# Patient Record
Sex: MALE | Race: WHITE | Employment: FULL TIME | URBAN - METROPOLITAN AREA
[De-identification: names, ages, dates, MRNs, and addresses within clinical notes are randomized per-mention and may not be internally consistent; named-entity substitution may affect disease eponyms.]

---

## 2022-12-26 ENCOUNTER — APPOINTMENT (OUTPATIENT)
Dept: CT IMAGING | Age: 42
DRG: 638 | End: 2022-12-26

## 2022-12-26 ENCOUNTER — APPOINTMENT (OUTPATIENT)
Dept: GENERAL RADIOLOGY | Age: 42
DRG: 638 | End: 2022-12-26

## 2022-12-26 ENCOUNTER — HOSPITAL ENCOUNTER (INPATIENT)
Age: 42
LOS: 1 days | Discharge: HOME | DRG: 638 | End: 2022-12-28
Attending: EMERGENCY MEDICINE | Admitting: STUDENT IN AN ORGANIZED HEALTH CARE EDUCATION/TRAINING PROGRAM

## 2022-12-26 DIAGNOSIS — E11.628 TYPE 2 DIABETES MELLITUS WITH OTHER SKIN COMPLICATION, WITHOUT LONG-TERM CURRENT USE OF INSULIN (HCC): ICD-10-CM

## 2022-12-26 DIAGNOSIS — E11.9 DIABETES MELLITUS, NEW ONSET (HCC): ICD-10-CM

## 2022-12-26 DIAGNOSIS — E11.622 TYPE 2 DIABETES MELLITUS WITH OTHER SKIN ULCER, WITHOUT LONG-TERM CURRENT USE OF INSULIN (HCC): ICD-10-CM

## 2022-12-26 DIAGNOSIS — R73.9 HYPERGLYCEMIA: ICD-10-CM

## 2022-12-26 DIAGNOSIS — L03.115 CELLULITIS OF RIGHT LOWER EXTREMITY: Primary | ICD-10-CM

## 2022-12-26 LAB
ABSOLUTE EOS #: 0 K/UL (ref 0–0.4)
ABSOLUTE LYMPH #: 0.8 K/UL (ref 1–4.8)
ABSOLUTE MONO #: 0.7 K/UL (ref 0.1–1.2)
ALLEN TEST: ABNORMAL
ANION GAP SERPL CALCULATED.3IONS-SCNC: 17 MMOL/L (ref 9–17)
BACTERIA: ABNORMAL
BASOPHILS # BLD: 0 % (ref 0–2)
BASOPHILS ABSOLUTE: 0 K/UL (ref 0–0.2)
BETA-HYDROXYBUTYRATE: 1.7 MMOL/L (ref 0.02–0.27)
BILIRUBIN URINE: NEGATIVE
BUN BLDV-MCNC: 46 MG/DL (ref 6–20)
CALCIUM SERPL-MCNC: 10.2 MG/DL (ref 8.6–10.4)
CHLORIDE BLD-SCNC: 99 MMOL/L (ref 98–107)
CO2: 21 MMOL/L (ref 20–31)
COLOR: YELLOW
CREAT SERPL-MCNC: 0.64 MG/DL (ref 0.7–1.2)
D-DIMER QUANTITATIVE: 0.57 MG/L FEU
EOSINOPHILS RELATIVE PERCENT: 0 % (ref 1–4)
EPITHELIAL CELLS UA: ABNORMAL /HPF (ref 0–5)
FLU A ANTIGEN: NEGATIVE
FLU B ANTIGEN: NEGATIVE
GFR SERPL CREATININE-BSD FRML MDRD: >60 ML/MIN/1.73M2
GLUCOSE BLD-MCNC: 453 MG/DL (ref 75–110)
GLUCOSE BLD-MCNC: 545 MG/DL (ref 75–110)
GLUCOSE BLD-MCNC: 609 MG/DL (ref 70–99)
GLUCOSE URINE: ABNORMAL
HCO3 VENOUS: 25.4 MMOL/L (ref 22–29)
HCT VFR BLD CALC: 42.8 % (ref 41–53)
HEMOGLOBIN: 13.6 G/DL (ref 13.5–17.5)
KETONES, URINE: ABNORMAL
LEUKOCYTE ESTERASE, URINE: NEGATIVE
LYMPHOCYTES # BLD: 10 % (ref 24–44)
MCH RBC QN AUTO: 23.4 PG (ref 26–34)
MCHC RBC AUTO-ENTMCNC: 31.8 G/DL (ref 31–37)
MCV RBC AUTO: 73.7 FL (ref 80–100)
MONOCYTES # BLD: 9 % (ref 2–11)
NITRITE, URINE: NEGATIVE
O2 DEVICE/FLOW/%: ABNORMAL
O2 SAT, VEN: 92 % (ref 60–85)
OTHER OBSERVATIONS UA: ABNORMAL
PCO2, VEN: 38.9 MM HG (ref 41–51)
PDW BLD-RTO: 16 % (ref 12.5–15.4)
PH UA: 5.5 (ref 5–8)
PH VENOUS: 7.42 (ref 7.32–7.43)
PLATELET # BLD: 499 K/UL (ref 140–450)
PMV BLD AUTO: 7.5 FL (ref 6–12)
PO2, VEN: 63.4 MM HG (ref 30–50)
POSITIVE BASE EXCESS, VEN: 1 (ref 0–3)
POTASSIUM SERPL-SCNC: 4.2 MMOL/L (ref 3.7–5.3)
PROTEIN UA: ABNORMAL
RBC # BLD: 5.8 M/UL (ref 4.5–5.9)
RBC UA: ABNORMAL /HPF (ref 0–2)
SARS-COV-2, RAPID: NOT DETECTED
SEG NEUTROPHILS: 81 % (ref 36–66)
SEGMENTED NEUTROPHILS ABSOLUTE COUNT: 6.2 K/UL (ref 1.8–7.7)
SODIUM BLD-SCNC: 137 MMOL/L (ref 135–144)
SPECIFIC GRAVITY UA: 1.01 (ref 1–1.03)
SPECIMEN DESCRIPTION: NORMAL
TURBIDITY: CLEAR
URINE HGB: ABNORMAL
UROBILINOGEN, URINE: NORMAL
WBC # BLD: 7.7 K/UL (ref 3.5–11)
WBC UA: ABNORMAL /HPF (ref 0–5)

## 2022-12-26 PROCEDURE — 96365 THER/PROPH/DIAG IV INF INIT: CPT

## 2022-12-26 PROCEDURE — 36415 COLL VENOUS BLD VENIPUNCTURE: CPT

## 2022-12-26 PROCEDURE — 6370000000 HC RX 637 (ALT 250 FOR IP): Performed by: EMERGENCY MEDICINE

## 2022-12-26 PROCEDURE — 93005 ELECTROCARDIOGRAM TRACING: CPT | Performed by: EMERGENCY MEDICINE

## 2022-12-26 PROCEDURE — 82947 ASSAY GLUCOSE BLOOD QUANT: CPT

## 2022-12-26 PROCEDURE — 73590 X-RAY EXAM OF LOWER LEG: CPT

## 2022-12-26 PROCEDURE — 6360000004 HC RX CONTRAST MEDICATION: Performed by: EMERGENCY MEDICINE

## 2022-12-26 PROCEDURE — 6360000002 HC RX W HCPCS: Performed by: EMERGENCY MEDICINE

## 2022-12-26 PROCEDURE — 99285 EMERGENCY DEPT VISIT HI MDM: CPT

## 2022-12-26 PROCEDURE — 82803 BLOOD GASES ANY COMBINATION: CPT

## 2022-12-26 PROCEDURE — 87635 SARS-COV-2 COVID-19 AMP PRB: CPT

## 2022-12-26 PROCEDURE — 71260 CT THORAX DX C+: CPT | Performed by: EMERGENCY MEDICINE

## 2022-12-26 PROCEDURE — 81001 URINALYSIS AUTO W/SCOPE: CPT

## 2022-12-26 PROCEDURE — 2580000003 HC RX 258: Performed by: EMERGENCY MEDICINE

## 2022-12-26 PROCEDURE — 87804 INFLUENZA ASSAY W/OPTIC: CPT

## 2022-12-26 PROCEDURE — 85025 COMPLETE CBC W/AUTO DIFF WBC: CPT

## 2022-12-26 PROCEDURE — 80048 BASIC METABOLIC PNL TOTAL CA: CPT

## 2022-12-26 PROCEDURE — 85379 FIBRIN DEGRADATION QUANT: CPT

## 2022-12-26 PROCEDURE — 82010 KETONE BODYS QUAN: CPT

## 2022-12-26 RX ORDER — 0.9 % SODIUM CHLORIDE 0.9 %
80 INTRAVENOUS SOLUTION INTRAVENOUS ONCE
Status: DISCONTINUED | OUTPATIENT
Start: 2022-12-27 | End: 2022-12-27

## 2022-12-26 RX ORDER — 0.9 % SODIUM CHLORIDE 0.9 %
1000 INTRAVENOUS SOLUTION INTRAVENOUS ONCE
Status: COMPLETED | OUTPATIENT
Start: 2022-12-26 | End: 2022-12-26

## 2022-12-26 RX ORDER — SODIUM CHLORIDE 0.9 % (FLUSH) 0.9 %
10 SYRINGE (ML) INJECTION PRN
Status: DISCONTINUED | OUTPATIENT
Start: 2022-12-26 | End: 2022-12-27

## 2022-12-26 RX ADMIN — IOPAMIDOL 75 ML: 755 INJECTION, SOLUTION INTRAVENOUS at 23:57

## 2022-12-26 RX ADMIN — INSULIN HUMAN 10 UNITS: 100 INJECTION, SOLUTION PARENTERAL at 22:26

## 2022-12-26 RX ADMIN — Medication 80 ML: at 23:48

## 2022-12-26 RX ADMIN — SODIUM CHLORIDE, PRESERVATIVE FREE 10 ML: 5 INJECTION INTRAVENOUS at 23:57

## 2022-12-26 RX ADMIN — SODIUM CHLORIDE 1000 ML: 9 INJECTION, SOLUTION INTRAVENOUS at 22:00

## 2022-12-26 RX ADMIN — VANCOMYCIN HYDROCHLORIDE 1250 MG: 1 INJECTION, POWDER, LYOPHILIZED, FOR SOLUTION INTRAVENOUS at 22:03

## 2022-12-26 ASSESSMENT — PAIN - FUNCTIONAL ASSESSMENT: PAIN_FUNCTIONAL_ASSESSMENT: NONE - DENIES PAIN

## 2022-12-27 PROBLEM — L02.415 CELLULITIS AND ABSCESS OF RIGHT LEG: Status: ACTIVE | Noted: 2022-12-27

## 2022-12-27 PROBLEM — E11.628 TYPE 2 DIABETES MELLITUS WITH SKIN COMPLICATION, WITHOUT LONG-TERM CURRENT USE OF INSULIN (HCC): Status: ACTIVE | Noted: 2022-12-27

## 2022-12-27 PROBLEM — L03.115 CELLULITIS AND ABSCESS OF RIGHT LEG: Status: ACTIVE | Noted: 2022-12-27

## 2022-12-27 LAB
ABSOLUTE EOS #: 0 K/UL (ref 0–0.4)
ABSOLUTE LYMPH #: 1 K/UL (ref 1–4.8)
ABSOLUTE MONO #: 1 K/UL (ref 0.1–1.2)
ALBUMIN SERPL-MCNC: 2.8 G/DL (ref 3.5–5.2)
ALBUMIN/GLOBULIN RATIO: 0.9 (ref 1–2.5)
ALP BLD-CCNC: 106 U/L (ref 40–129)
ALT SERPL-CCNC: 57 U/L (ref 5–41)
ANION GAP SERPL CALCULATED.3IONS-SCNC: 10 MMOL/L (ref 9–17)
AST SERPL-CCNC: 37 U/L
BASOPHILS # BLD: 1 % (ref 0–2)
BASOPHILS ABSOLUTE: 0 K/UL (ref 0–0.2)
BILIRUB SERPL-MCNC: 0.2 MG/DL (ref 0.3–1.2)
BUN BLDV-MCNC: 28 MG/DL (ref 6–20)
CALCIUM SERPL-MCNC: 8.4 MG/DL (ref 8.6–10.4)
CHLORIDE BLD-SCNC: 110 MMOL/L (ref 98–107)
CO2: 24 MMOL/L (ref 20–31)
CREAT SERPL-MCNC: 0.55 MG/DL (ref 0.7–1.2)
EKG ATRIAL RATE: 142 BPM
EKG P AXIS: 53 DEGREES
EKG P-R INTERVAL: 132 MS
EKG Q-T INTERVAL: 272 MS
EKG QRS DURATION: 78 MS
EKG QTC CALCULATION (BAZETT): 418 MS
EKG R AXIS: 50 DEGREES
EKG T AXIS: 31 DEGREES
EKG VENTRICULAR RATE: 142 BPM
EOSINOPHILS RELATIVE PERCENT: 0 % (ref 1–4)
ESTIMATED AVERAGE GLUCOSE: 387 MG/DL
GFR SERPL CREATININE-BSD FRML MDRD: >60 ML/MIN/1.73M2
GLUCOSE BLD-MCNC: 206 MG/DL (ref 75–110)
GLUCOSE BLD-MCNC: 209 MG/DL (ref 75–110)
GLUCOSE BLD-MCNC: 209 MG/DL (ref 75–110)
GLUCOSE BLD-MCNC: 210 MG/DL (ref 70–99)
GLUCOSE BLD-MCNC: 213 MG/DL (ref 75–110)
GLUCOSE BLD-MCNC: 276 MG/DL (ref 75–110)
GLUCOSE BLD-MCNC: 300 MG/DL (ref 75–110)
GLUCOSE BLD-MCNC: 349 MG/DL (ref 75–110)
HBA1C MFR BLD: 15.1 % (ref 4–6)
HCT VFR BLD CALC: 34.8 % (ref 41–53)
HEMOGLOBIN: 11.2 G/DL (ref 13.5–17.5)
LYMPHOCYTES # BLD: 16 % (ref 24–44)
MCH RBC QN AUTO: 23.4 PG (ref 26–34)
MCHC RBC AUTO-ENTMCNC: 32.2 G/DL (ref 31–37)
MCV RBC AUTO: 72.8 FL (ref 80–100)
MONOCYTES # BLD: 16 % (ref 2–11)
PDW BLD-RTO: 15.8 % (ref 12.5–15.4)
PLATELET # BLD: 418 K/UL (ref 140–450)
PMV BLD AUTO: 7 FL (ref 6–12)
POTASSIUM SERPL-SCNC: 4.1 MMOL/L (ref 3.7–5.3)
RBC # BLD: 4.79 M/UL (ref 4.5–5.9)
SEG NEUTROPHILS: 67 % (ref 36–66)
SEGMENTED NEUTROPHILS ABSOLUTE COUNT: 4.3 K/UL (ref 1.8–7.7)
SODIUM BLD-SCNC: 144 MMOL/L (ref 135–144)
TOTAL PROTEIN: 5.9 G/DL (ref 6.4–8.3)
WBC # BLD: 6.4 K/UL (ref 3.5–11)

## 2022-12-27 PROCEDURE — 36415 COLL VENOUS BLD VENIPUNCTURE: CPT

## 2022-12-27 PROCEDURE — 6370000000 HC RX 637 (ALT 250 FOR IP): Performed by: STUDENT IN AN ORGANIZED HEALTH CARE EDUCATION/TRAINING PROGRAM

## 2022-12-27 PROCEDURE — 6360000002 HC RX W HCPCS: Performed by: STUDENT IN AN ORGANIZED HEALTH CARE EDUCATION/TRAINING PROGRAM

## 2022-12-27 PROCEDURE — 85025 COMPLETE CBC W/AUTO DIFF WBC: CPT

## 2022-12-27 PROCEDURE — 86403 PARTICLE AGGLUT ANTBDY SCRN: CPT

## 2022-12-27 PROCEDURE — 2060000000 HC ICU INTERMEDIATE R&B

## 2022-12-27 PROCEDURE — 87070 CULTURE OTHR SPECIMN AEROBIC: CPT

## 2022-12-27 PROCEDURE — 80053 COMPREHEN METABOLIC PANEL: CPT

## 2022-12-27 PROCEDURE — 6370000000 HC RX 637 (ALT 250 FOR IP): Performed by: EMERGENCY MEDICINE

## 2022-12-27 PROCEDURE — 87205 SMEAR GRAM STAIN: CPT

## 2022-12-27 PROCEDURE — 83036 HEMOGLOBIN GLYCOSYLATED A1C: CPT

## 2022-12-27 PROCEDURE — 82947 ASSAY GLUCOSE BLOOD QUANT: CPT

## 2022-12-27 PROCEDURE — 2580000003 HC RX 258: Performed by: STUDENT IN AN ORGANIZED HEALTH CARE EDUCATION/TRAINING PROGRAM

## 2022-12-27 PROCEDURE — 2580000003 HC RX 258: Performed by: CLINICAL NURSE SPECIALIST

## 2022-12-27 PROCEDURE — 99221 1ST HOSP IP/OBS SF/LOW 40: CPT | Performed by: STUDENT IN AN ORGANIZED HEALTH CARE EDUCATION/TRAINING PROGRAM

## 2022-12-27 PROCEDURE — 2580000003 HC RX 258: Performed by: EMERGENCY MEDICINE

## 2022-12-27 PROCEDURE — 2580000003 HC RX 258: Performed by: NURSE PRACTITIONER

## 2022-12-27 RX ORDER — MAGNESIUM SULFATE 1 G/100ML
1000 INJECTION INTRAVENOUS PRN
Status: DISCONTINUED | OUTPATIENT
Start: 2022-12-27 | End: 2022-12-28 | Stop reason: HOSPADM

## 2022-12-27 RX ORDER — ENOXAPARIN SODIUM 100 MG/ML
40 INJECTION SUBCUTANEOUS DAILY
Status: DISCONTINUED | OUTPATIENT
Start: 2022-12-27 | End: 2022-12-28 | Stop reason: HOSPADM

## 2022-12-27 RX ORDER — POTASSIUM CHLORIDE 7.45 MG/ML
10 INJECTION INTRAVENOUS PRN
Status: DISCONTINUED | OUTPATIENT
Start: 2022-12-27 | End: 2022-12-28 | Stop reason: HOSPADM

## 2022-12-27 RX ORDER — ACETAMINOPHEN 325 MG/1
650 TABLET ORAL EVERY 6 HOURS PRN
Status: DISCONTINUED | OUTPATIENT
Start: 2022-12-27 | End: 2022-12-28 | Stop reason: HOSPADM

## 2022-12-27 RX ORDER — INSULIN LISPRO 100 [IU]/ML
0-16 INJECTION, SOLUTION INTRAVENOUS; SUBCUTANEOUS
Status: DISCONTINUED | OUTPATIENT
Start: 2022-12-27 | End: 2022-12-28 | Stop reason: HOSPADM

## 2022-12-27 RX ORDER — SODIUM CHLORIDE 9 MG/ML
INJECTION, SOLUTION INTRAVENOUS CONTINUOUS
Status: DISCONTINUED | OUTPATIENT
Start: 2022-12-27 | End: 2022-12-27

## 2022-12-27 RX ORDER — WATER 1000 ML/1000ML
40 INJECTION, SOLUTION INTRAVENOUS ONCE
Status: COMPLETED | OUTPATIENT
Start: 2022-12-27 | End: 2022-12-27

## 2022-12-27 RX ORDER — INSULIN GLARGINE 100 [IU]/ML
10 INJECTION, SOLUTION SUBCUTANEOUS 2 TIMES DAILY
Status: DISCONTINUED | OUTPATIENT
Start: 2022-12-27 | End: 2022-12-27

## 2022-12-27 RX ORDER — ONDANSETRON 2 MG/ML
4 INJECTION INTRAMUSCULAR; INTRAVENOUS EVERY 6 HOURS PRN
Status: DISCONTINUED | OUTPATIENT
Start: 2022-12-27 | End: 2022-12-28 | Stop reason: HOSPADM

## 2022-12-27 RX ORDER — SULFAMETHOXAZOLE AND TRIMETHOPRIM 800; 160 MG/1; MG/1
TABLET ORAL
Status: ON HOLD | COMMUNITY
Start: 2022-12-22 | End: 2022-12-28 | Stop reason: HOSPADM

## 2022-12-27 RX ORDER — HYDROCODONE BITARTRATE AND ACETAMINOPHEN 5; 325 MG/1; MG/1
1 TABLET ORAL EVERY 4 HOURS PRN
Status: DISCONTINUED | OUTPATIENT
Start: 2022-12-27 | End: 2022-12-28 | Stop reason: HOSPADM

## 2022-12-27 RX ORDER — INSULIN GLARGINE 100 [IU]/ML
20 INJECTION, SOLUTION SUBCUTANEOUS 2 TIMES DAILY
Status: DISCONTINUED | OUTPATIENT
Start: 2022-12-27 | End: 2022-12-28 | Stop reason: HOSPADM

## 2022-12-27 RX ORDER — HYDROCODONE BITARTRATE AND ACETAMINOPHEN 5; 325 MG/1; MG/1
2 TABLET ORAL EVERY 4 HOURS PRN
Status: DISCONTINUED | OUTPATIENT
Start: 2022-12-27 | End: 2022-12-28 | Stop reason: HOSPADM

## 2022-12-27 RX ORDER — 0.9 % SODIUM CHLORIDE 0.9 %
1000 INTRAVENOUS SOLUTION INTRAVENOUS ONCE
Status: COMPLETED | OUTPATIENT
Start: 2022-12-27 | End: 2022-12-27

## 2022-12-27 RX ORDER — SODIUM CHLORIDE 9 MG/ML
INJECTION, SOLUTION INTRAVENOUS PRN
Status: DISCONTINUED | OUTPATIENT
Start: 2022-12-27 | End: 2022-12-28 | Stop reason: HOSPADM

## 2022-12-27 RX ORDER — ONDANSETRON 4 MG/1
4 TABLET, ORALLY DISINTEGRATING ORAL EVERY 8 HOURS PRN
Status: DISCONTINUED | OUTPATIENT
Start: 2022-12-27 | End: 2022-12-28 | Stop reason: HOSPADM

## 2022-12-27 RX ORDER — POTASSIUM CHLORIDE 20 MEQ/1
40 TABLET, EXTENDED RELEASE ORAL PRN
Status: DISCONTINUED | OUTPATIENT
Start: 2022-12-27 | End: 2022-12-28 | Stop reason: HOSPADM

## 2022-12-27 RX ORDER — POLYETHYLENE GLYCOL 3350 17 G/17G
17 POWDER, FOR SOLUTION ORAL DAILY PRN
Status: DISCONTINUED | OUTPATIENT
Start: 2022-12-27 | End: 2022-12-28 | Stop reason: HOSPADM

## 2022-12-27 RX ORDER — INSULIN LISPRO 100 [IU]/ML
0-4 INJECTION, SOLUTION INTRAVENOUS; SUBCUTANEOUS NIGHTLY
Status: DISCONTINUED | OUTPATIENT
Start: 2022-12-27 | End: 2022-12-28 | Stop reason: HOSPADM

## 2022-12-27 RX ORDER — SODIUM CHLORIDE 0.9 % (FLUSH) 0.9 %
10 SYRINGE (ML) INJECTION PRN
Status: DISCONTINUED | OUTPATIENT
Start: 2022-12-27 | End: 2022-12-28 | Stop reason: HOSPADM

## 2022-12-27 RX ORDER — SODIUM CHLORIDE 0.9 % (FLUSH) 0.9 %
5-40 SYRINGE (ML) INJECTION EVERY 12 HOURS SCHEDULED
Status: DISCONTINUED | OUTPATIENT
Start: 2022-12-27 | End: 2022-12-28 | Stop reason: HOSPADM

## 2022-12-27 RX ADMIN — INSULIN HUMAN 10 UNITS: 100 INJECTION, SOLUTION PARENTERAL at 00:36

## 2022-12-27 RX ADMIN — SODIUM CHLORIDE: 9 INJECTION, SOLUTION INTRAVENOUS at 02:53

## 2022-12-27 RX ADMIN — WATER 40 ML: 1 INJECTION INTRAMUSCULAR; INTRAVENOUS; SUBCUTANEOUS at 23:41

## 2022-12-27 RX ADMIN — INSULIN LISPRO 4 UNITS: 100 INJECTION, SOLUTION INTRAVENOUS; SUBCUTANEOUS at 16:53

## 2022-12-27 RX ADMIN — INSULIN GLARGINE 20 UNITS: 100 INJECTION, SOLUTION SUBCUTANEOUS at 21:31

## 2022-12-27 RX ADMIN — INSULIN LISPRO 4 UNITS: 100 INJECTION, SOLUTION INTRAVENOUS; SUBCUTANEOUS at 08:51

## 2022-12-27 RX ADMIN — SODIUM CHLORIDE, PRESERVATIVE FREE 10 ML: 5 INJECTION INTRAVENOUS at 08:54

## 2022-12-27 RX ADMIN — INSULIN GLARGINE 10 UNITS: 100 INJECTION, SOLUTION SUBCUTANEOUS at 08:51

## 2022-12-27 RX ADMIN — VANCOMYCIN HYDROCHLORIDE 1250 MG: 1 INJECTION, POWDER, LYOPHILIZED, FOR SOLUTION INTRAVENOUS at 11:00

## 2022-12-27 RX ADMIN — INSULIN LISPRO 8 UNITS: 100 INJECTION, SOLUTION INTRAVENOUS; SUBCUTANEOUS at 12:07

## 2022-12-27 RX ADMIN — SODIUM CHLORIDE 1000 ML: 9 INJECTION, SOLUTION INTRAVENOUS at 01:51

## 2022-12-27 RX ADMIN — VANCOMYCIN HYDROCHLORIDE 1250 MG: 1 INJECTION, POWDER, LYOPHILIZED, FOR SOLUTION INTRAVENOUS at 23:44

## 2022-12-27 ASSESSMENT — ENCOUNTER SYMPTOMS
RHINORRHEA: 0
PHOTOPHOBIA: 0
ABDOMINAL PAIN: 0
SORE THROAT: 0
BACK PAIN: 0
CONSTIPATION: 0
DIARRHEA: 0
SHORTNESS OF BREATH: 0
NAUSEA: 0
COUGH: 0
VOMITING: 0

## 2022-12-27 ASSESSMENT — PAIN - FUNCTIONAL ASSESSMENT: PAIN_FUNCTIONAL_ASSESSMENT: NONE - DENIES PAIN

## 2022-12-27 NOTE — PLAN OF CARE
Problem: Discharge Planning  Goal: Discharge to home or other facility with appropriate resources  12/27/2022 1327 by Jennifer Sims RN  Outcome: Progressing  Flowsheets (Taken 12/27/2022 0800)  Discharge to home or other facility with appropriate resources: Identify barriers to discharge with patient and caregiver  12/27/2022 0310 by Reina Pulido RN  Outcome: Progressing     Problem: Skin/Tissue Integrity  Goal: Absence of new skin breakdown  Description: 1. Monitor for areas of redness and/or skin breakdown  2. Assess vascular access sites hourly  3. Every 4-6 hours minimum:  Change oxygen saturation probe site  4. Every 4-6 hours:  If on nasal continuous positive airway pressure, respiratory therapy assess nares and determine need for appliance change or resting period.   12/27/2022 1327 by Jennifer Sims RN  Outcome: Progressing  12/27/2022 0310 by Reina Pulido RN  Outcome: Progressing     Problem: Pain  Goal: Verbalizes/displays adequate comfort level or baseline comfort level  Outcome: Progressing     Problem: Nutrition Deficit:  Goal: Optimize nutritional status  12/27/2022 1327 by Jennifer Sims RN  Outcome: Progressing  12/27/2022 0310 by Reina Pulido RN  Outcome: Progressing

## 2022-12-27 NOTE — CONSULTS
Nutrition Education    Educated on 12/27/22  Learners: Patient  Readiness: Acceptance  Method: Explanation and Handout  Response: Needs Reinforcement and No Evidence of Learning  Contact name and number provided.     Memory Crigler, MPP-D, RDN, LD  [place] [id number]  [phone]

## 2022-12-27 NOTE — PROGRESS NOTES
Pt arrived to the floor and was oriented to his room and equipment. Blood sugar was check. Went over medication and completed patient admission assessment. Writer verify with Np that next blood sugar check will be in the morning before breakfast. Wound on right lower legs was cleanse and foam dressing was put on for patient.

## 2022-12-27 NOTE — H&P
[facility]  Office: [address], DO, Garo Ards, DO, [place], DO, [place], DO, Tyshawn Velasquez MD, Tania Pierson MD, Maria Esther Molina MD, Haylee Marcial MD,  Maya Arambula MD, Pat Kwok MD, Td Doyle, DO, Sri Kenney MD,  Jackie Barrios MD, Zoya Davidson MD, Tori Moritz, DO, Fransisco Gutierrez MD, Donna Chung MD, Cecil Buerger, DO, Marcel Dickerson MD, Breann Montoya MD, Gurinder Norton MD, Hema Pace MD, William Bartlett DO, Casper Boyer MD, Nguyen Hicks MD, Rumalda Snellen, CNP,  Joe Nicole, CNP, Amando Tovar, CNP, Juan C Winter, CNP,  Abimbola Vazquez, DNP, Tracy Scott, CNP, Magda Forst, CNP, Josefa Pena, CNP, Shakira Murphy, CNP, Evan Deluca, CNP, Liborio Chaparro PA-C, Panfilo Luis, CNS, Enma Arana, CNP, Adwoa Brown, CNP         [facility]    HISTORY AND PHYSICAL EXAMINATION            Date:   12/27/2022  Patient name:  Paula Dumont  Date of admission:  12/26/2022  8:54 PM  MRN:   [id number]  Account:  [de-identified]  YOB: 1980  PCP:    None None  Room:   [address] [place] [address]  Code Status:    Full Code    Chief Complaint:     Chief Complaint   Patient presents with    Hyperglycemia    Wound Infection     Right LE leg infection     History Obtained From:     patient    History of Present Illness:     Paula Dumont is a 38 y.o. male with a past medical history of recently diagnosed Type II DM who presented to the emergency department on 12/27/2022 complaining of pain and swelling in his right leg. The patient states that his symptoms began several days ago and have progressively worsened. The patient was diagnosed with a right lower extremity abscess as well as new-onset Type II DM by his PCP several days ago and states that the abscess was lanced in his office.  The patient was started on Bactrim following the procedure but states that he has not noticed any improvement with this. In the ED, the patient was afebrile but tachycardic and ill-appearing. His blood sugar was noted to be 609 on presentation but the patient was not in DKA. He is admitted to internal medicine for further management of right lower extremity abscess/cellulitis as well as hyperglycemia secondary to new-onset Type II DM. Past Medical History:     History reviewed. No pertinent past medical history. Past Surgical History:     History reviewed. No pertinent surgical history. Medications Prior to Admission:     Prior to Admission medications    Medication Sig Start Date End Date Taking? Authorizing Provider   sulfamethoxazole-trimethoprim (BACTRIM DS;SEPTRA DS) 800-160 MG per tablet TAKE 1 TABLET BY MOUTH TWICE DAILY FOR 10 DAYS 12/22/22   Historical Provider, MD        Allergies:     Patient has no known allergies. Social History:     Tobacco:    reports that he has never smoked. He has never been exposed to tobacco smoke. He has never used smokeless tobacco.  Alcohol:      reports no history of alcohol use. Drug Use:  reports no history of drug use. Family History:     Family History   Problem Relation Age of Onset    Diabetes type 2  Mother     Diabetes type 2  Father        Review of Systems:     Positive and Negative as described in HPI. CONSTITUTIONAL:  negative for fevers, chills, sweats, fatigue, weight loss  HEENT:  negative for vision, hearing changes, runny nose, throat pain  RESPIRATORY:  negative for shortness of breath, cough, congestion, wheezing  CARDIOVASCULAR:  negative for chest pain, palpitations  GASTROINTESTINAL:  negative for nausea, vomiting, diarrhea, constipation, change in bowel habits, abdominal pain   GENITOURINARY:  negative for difficulty of urination, burning with urination, frequency   INTEGUMENT:  Positive for right lower extremity cellulitis.    HEMATOLOGIC/LYMPHATIC:  negative for swelling/edema   ALLERGIC/IMMUNOLOGIC:  negative for urticaria , itching  ENDOCRINE:  negative increase in drinking, increase in urination, hot or cold intolerance  MUSCULOSKELETAL:  negative joint pains, muscle aches, swelling of joints  NEUROLOGICAL:  negative for headaches, dizziness, lightheadedness, numbness, pain, tingling extremities  BEHAVIOR/PSYCH:  negative for depression, anxiety    Physical Exam:   BP (!) 162/90   Pulse (!) 126   Temp 98.1 °F (36.7 °C) (Oral)   Resp 18   Ht 6' 4\" (1.93 m)   Wt 185 lb (83.9 kg)   SpO2 96%   BMI 22.52 kg/m²   Temp (24hrs), Av.7 °F (37.1 °C), Min:98.1 °F (36.7 °C), Max:99.3 °F (37.4 °C)    Recent Labs     22  2346 22  0141 22  0246 22  0618   POCGLU 453* 349* 300* 213*     No intake or output data in the 24 hours ending 22 0811    General Appearance:  alert, well appearing, and in no acute distress  Mental status: oriented to person, place, and time  Head:  normocephalic, atraumatic  Eye: no icterus, redness, pupils equal and reactive, extraocular eye movements intact, conjunctiva clear  Ear: normal external ear, no discharge, hearing intact  Nose:  no drainage noted  Mouth: mucous membranes moist  Neck: supple, no carotid bruits, thyroid not palpable  Lungs: Bilateral equal air entry, clear to ausculation, no wheezing, rales or rhonchi, normal effort  Cardiovascular: normal rate, regular rhythm, no murmur, gallop, rub  Abdomen: Soft, nontender, nondistended, normal bowel sounds, no hepatomegaly or splenomegaly  Neurologic: There are no new focal motor or sensory deficits, normal muscle tone and bulk, no abnormal sensation, normal speech, cranial nerves II through XII grossly intact  Skin: Right lower extremity cellulitis appreciated. Extremities:  Right lower extremity cellulitis appreciated.    Psych: normal affect     Investigations:      Laboratory Testing:  Recent Results (from the past 24 hour(s))   POC Glucose Fingerstick    Collection Time: 22  9:01 PM   Result Value Ref Range    POC Glucose 545 (HH) 75 - 110 mg/dL   Basic Metabolic Panel    Collection Time: 12/26/22  9:10 PM   Result Value Ref Range    Glucose 609 (HH) 70 - 99 mg/dL    BUN 46 (H) 6 - 20 mg/dL    Creatinine 0.64 (L) 0.70 - 1.20 mg/dL    Est, Glom Filt Rate >60 >60 mL/min/1.73m2    Calcium 10.2 8.6 - 10.4 mg/dL    Sodium 137 135 - 144 mmol/L    Potassium 4.2 3.7 - 5.3 mmol/L    Chloride 99 98 - 107 mmol/L    CO2 21 20 - 31 mmol/L    Anion Gap 17 9 - 17 mmol/L   CBC with Auto Differential    Collection Time: 12/26/22  9:10 PM   Result Value Ref Range    WBC 7.7 3.5 - 11.0 k/uL    RBC 5.80 4.5 - 5.9 m/uL    Hemoglobin 13.6 13.5 - 17.5 g/dL    Hematocrit 42.8 41 - 53 %    MCV 73.7 (L) 80 - 100 fL    MCH 23.4 (L) 26 - 34 pg    MCHC 31.8 31 - 37 g/dL    RDW 16.0 (H) 12.5 - 15.4 %    Platelets 957 (H) 245 - 450 k/uL    MPV 7.5 6.0 - 12.0 fL    Seg Neutrophils 81 (H) 36 - 66 %    Lymphocytes 10 (L) 24 - 44 %    Monocytes 9 2 - 11 %    Eosinophils % 0 (L) 1 - 4 %    Basophils 0 0 - 2 %    Segs Absolute 6.20 1.8 - 7.7 k/uL    Absolute Lymph # 0.80 (L) 1.0 - 4.8 k/uL    Absolute Mono # 0.70 0.1 - 1.2 k/uL    Absolute Eos # 0.00 0.0 - 0.4 k/uL    Basophils Absolute 0.00 0.0 - 0.2 k/uL   Beta-Hydroxybutyrate    Collection Time: 12/26/22  9:10 PM   Result Value Ref Range    Beta-Hydroxybutyrate 1.70 (H) 0.02 - 0.27 mmol/L   D-Dimer, Quantitative    Collection Time: 12/26/22  9:10 PM   Result Value Ref Range    D-Dimer, Quant 0.57 mg/L FEU   Urinalysis    Collection Time: 12/26/22  9:15 PM   Result Value Ref Range    Color, UA Yellow Yellow    Turbidity UA Clear Clear    Glucose, Ur 3+ (A) NEGATIVE    Bilirubin Urine NEGATIVE NEGATIVE    Ketones, Urine SMALL (A) NEGATIVE    Specific Gravity, UA 1.015 1.005 - 1.030    Urine Hgb SMALL (A) NEGATIVE    pH, UA 5.5 5.0 - 8.0    Protein, UA 1+ (A) NEGATIVE    Urobilinogen, Urine Normal Normal    Nitrite, Urine NEGATIVE NEGATIVE    Leukocyte Esterase, Urine NEGATIVE NEGATIVE   Microscopic Urinalysis    Collection Time: 12/26/22  9:15 PM   Result Value Ref Range    WBC, UA 2 TO 5 0 - 5 /HPF    RBC, UA 2 TO 5 0 - 2 /HPF    Epithelial Cells UA 0 TO 2 0 - 5 /HPF    Bacteria, UA None None    Other Observations UA (A) NOT REQ. Utilizing a urinalysis as the only screening method to exclude a potential uropathogen can be unreliable in many patient populations. Rapid screening tests are less sensitive than culture and if UTI is a clinical possibility, culture should be considered despite a negative urinalysis. EKG 12 Lead    Collection Time: 12/26/22  9:23 PM   Result Value Ref Range    Ventricular Rate 142 BPM    Atrial Rate 142 BPM    P-R Interval 132 ms    QRS Duration 78 ms    Q-T Interval 272 ms    QTc Calculation (Bazett) 418 ms    P Axis 53 degrees    R Axis 50 degrees    T Axis 31 degrees   Venous Blood Gas, POC    Collection Time: 12/26/22  9:35 PM   Result Value Ref Range    pH, Roque 7.424 7.320 - 7.430    pCO2, Roque 38.9 (L) 41.0 - 51.0 mm Hg    pO2, Roque 63.4 (H) 30.0 - 50.0 mm Hg    HCO3, Venous 25.4 22.0 - 29.0 mmol/L    Positive Base Excess, Roque 1 0.0 - 3.0    O2 Sat, Roque 92 (H) 60.0 - 85.0 %    O2 Device/Flow/% Room Air     Shmuel Test NOT APPLICABLE    JCVEK-84, Rapid    Collection Time: 12/26/22 10:15 PM    Specimen: Nasopharyngeal Swab   Result Value Ref Range    Specimen Description . NASOPHARYNGEAL SWAB     SARS-CoV-2, Rapid Not Detected Not Detected   Rapid Influenza A/B Antigens    Collection Time: 12/26/22 10:15 PM    Specimen: Nasopharyngeal   Result Value Ref Range    Flu A Antigen NEGATIVE NEGATIVE    Flu B Antigen NEGATIVE NEGATIVE   POC Glucose Fingerstick    Collection Time: 12/26/22 11:46 PM   Result Value Ref Range    POC Glucose 453 (HH) 75 - 110 mg/dL   POC Glucose Fingerstick    Collection Time: 12/27/22  1:41 AM   Result Value Ref Range    POC Glucose 349 (H) 75 - 110 mg/dL   POC Glucose Fingerstick    Collection Time: 12/27/22  2:46 AM   Result Value Ref Range    POC Glucose 300 (H) 75 - 110 mg/dL   Comprehensive Metabolic Panel w/ Reflex to MG    Collection Time: 12/27/22  6:14 AM   Result Value Ref Range    Glucose 210 (H) 70 - 99 mg/dL    BUN 28 (H) 6 - 20 mg/dL    Creatinine 0.55 (L) 0.70 - 1.20 mg/dL    Est, Glom Filt Rate >60 >60 mL/min/1.73m2    Calcium 8.4 (L) 8.6 - 10.4 mg/dL    Sodium 144 135 - 144 mmol/L    Potassium 4.1 3.7 - 5.3 mmol/L    Chloride 110 (H) 98 - 107 mmol/L    CO2 24 20 - 31 mmol/L    Anion Gap 10 9 - 17 mmol/L    Alkaline Phosphatase 106 40 - 129 U/L    ALT 57 (H) 5 - 41 U/L    AST 37 <40 U/L    Total Bilirubin 0.2 (L) 0.3 - 1.2 mg/dL    Total Protein 5.9 (L) 6.4 - 8.3 g/dL    Albumin 2.8 (L) 3.5 - 5.2 g/dL    Albumin/Globulin Ratio 0.9 (L) 1.0 - 2.5   CBC with Auto Differential    Collection Time: 12/27/22  6:14 AM   Result Value Ref Range    WBC 6.4 3.5 - 11.0 k/uL    RBC 4.79 4.5 - 5.9 m/uL    Hemoglobin 11.2 (L) 13.5 - 17.5 g/dL    Hematocrit 34.8 (L) 41 - 53 %    MCV 72.8 (L) 80 - 100 fL    MCH 23.4 (L) 26 - 34 pg    MCHC 32.2 31 - 37 g/dL    RDW 15.8 (H) 12.5 - 15.4 %    Platelets 945 333 - 741 k/uL    MPV 7.0 6.0 - 12.0 fL    Seg Neutrophils 67 (H) 36 - 66 %    Lymphocytes 16 (L) 24 - 44 %    Monocytes 16 (H) 2 - 11 %    Eosinophils % 0 (L) 1 - 4 %    Basophils 1 0 - 2 %    Segs Absolute 4.30 1.8 - 7.7 k/uL    Absolute Lymph # 1.00 1.0 - 4.8 k/uL    Absolute Mono # 1.00 0.1 - 1.2 k/uL    Absolute Eos # 0.00 0.0 - 0.4 k/uL    Basophils Absolute 0.00 0.0 - 0.2 k/uL   POC Glucose Fingerstick    Collection Time: 12/27/22  6:18 AM   Result Value Ref Range    POC Glucose 213 (H) 75 - 110 mg/dL       Imaging/Diagnostics:  XR TIBIA FIBULA RIGHT (2 VIEWS)    Result Date: 12/26/2022  No acute osseous abnormality. No soft tissue gas or foreign body. CT CHEST PULMONARY EMBOLISM W CONTRAST    Result Date: 12/27/2022  No evidence of pulmonary embolism or acute pulmonary abnormality. Mucosal prominence of the stomach, suspicious for acute gastritis. Diffuse hepatic steatosis. RECOMMENDATIONS: Unavailable       Assessment :      Hospital Problems             Last Modified POA    * (Principal) Cellulitis and abscess of right leg 12/27/2022 Yes    Type 2 diabetes mellitus with skin complication, without long-term current use of insulin ([facility] [address] 12/27/2022 Yes       Plan:     Patient status inpatient in the Med/Surge    Right lower extremity cellulitis/abscess  -S/P outpatient incision and drainage   -Wound is not healing due to poorly controlled DM  -Vancomycin; pharmacy to dose   -Daily CBC   -Daily BMP     DM2   -Newly diagnosed   -Blood glucose was 609 on presentation to the ED   -Hemoglobin A1c pending   -Start Lantus 10 units bid   -High-dose sliding scale insulin   -Hypoglycemia protocol   -Patient will require insulin on discharge     Consultations:   [address] [facility]    Patient is admitted as inpatient status because of co-morbidities listed above, severity of signs and symptoms as outlined, requirement for current medical therapies and most importantly because of direct risk to patient if care not provided in a hospital setting. Expected length of stay > 48 hours.     Freada Osgood, MD  12/27/2022  8:11 AM    Copy sent to Dr. None None

## 2022-12-27 NOTE — ED PROVIDER NOTES
[address] Emergency Department  [address] [facility] [address] [facility] [address]  Phone: [phone]  Fax: [phone]        Pt Name: Rupal Longoria  MRN: [id number]  [place] 1980  Date of evaluation: 12/27/22      CHIEF COMPLAINT     Chief Complaint   Patient presents with    Hyperglycemia    Wound Infection     Right LE leg infection         HISTORY OF PRESENT ILLNESS  (Location/Symptom, Timing/Onset, Context/Setting, Quality, Duration, Modifying Factors, Severity.)    Rupal Longoria is a 38 y.o. male who presents elevated glucose. Patient states that he had noticed a wound on his right leg about a week ago. He states that he had a tiny cystic lesion in that area for years, and then it suddenly became erythematous and tender to the touch. He was evaluated in his [de-identified] office on Wednesday, and underwent an I&D of this lesion. He was started on Bactrim. Patient states that even taking Bactrim twice daily his wound was not improving. The redness seem to over a larger area. The area was still painful. Patient states that he was also found to have an elevated glucose by his primary care doctor. He states that the glucometer read greater than 600 in the office. He was given 10 of Lantus in the office, and discharged home on a prescription for Lantus. He was also changed to Keflex at that time. The patient denies fever or chills. He has been feeling dizzy, incredibly thirsty, urinating a lot, and having blurred vision. He denies nausea, vomiting, or abdominal pain. He has no known history of diabetes in the past.      REVIEW OF SYSTEMS    (2-9 systems for level 4, 10 or more for level 5)     Review of Systems   Constitutional:  Negative for chills and fever. HENT:  Negative for congestion, rhinorrhea and sore throat. Eyes:  Positive for visual disturbance. Negative for photophobia. Respiratory:  Negative for cough and shortness of breath.     Cardiovascular:  Negative for chest pain and palpitations. Gastrointestinal:  Negative for abdominal pain, constipation, diarrhea, nausea and vomiting. Genitourinary:  Negative for dysuria, frequency and urgency. Musculoskeletal:  Negative for back pain and neck pain. Skin:  Positive for wound. Negative for rash. Neurological:  Positive for dizziness and light-headedness. Negative for headaches. Hematological:  Negative for adenopathy. Does not bruise/bleed easily. PAST MEDICAL HISTORY    has no past medical history on file. SURGICAL HISTORY      has no past surgical history on file. CURRENTMEDICATIONS       Previous Medications    SULFAMETHOXAZOLE-TRIMETHOPRIM (BACTRIM DS;SEPTRA DS) 800-160 MG PER TABLET    TAKE 1 TABLET BY MOUTH TWICE DAILY FOR 10 DAYS       ALLERGIES     has No Known Allergies. FAMILY HISTORY     has no family status information on file. family history is not on file. SOCIAL HISTORY          PHYSICAL EXAM    (up to 7 for level 4, 8 or more for level 5)   INITIAL VITALS:  weight is 85.3 kg (188 lb). His oral temperature is 99.3 °F (37.4 °C). His blood pressure is 143/88 (abnormal) and his pulse is 117 (abnormal). His respiration is 14 and oxygen saturation is 94%. Physical Exam  Vitals and nursing note reviewed. Constitutional:       Appearance: He is ill-appearing. HENT:      Head: Normocephalic and atraumatic. Mouth/Throat:      Mouth: Mucous membranes are dry. Eyes:      Extraocular Movements: Extraocular movements intact. Pupils: Pupils are equal, round, and reactive to light. Cardiovascular:      Rate and Rhythm: Regular rhythm. Tachycardia present. Pulses: Normal pulses. Heart sounds: Normal heart sounds. No murmur heard. No friction rub. No gallop. Pulmonary:      Effort: Pulmonary effort is normal.      Breath sounds: Normal breath sounds. No wheezing, rhonchi or rales. Abdominal:      General: Abdomen is flat.  Bowel sounds are normal. Palpations: Abdomen is soft. Tenderness: There is no abdominal tenderness. There is no guarding or rebound. Musculoskeletal:         General: Tenderness present. Normal range of motion. Cervical back: Normal range of motion and neck supple. No tenderness. Right lower leg: No edema. Left lower leg: No edema. Skin:     General: Skin is warm and dry. Capillary Refill: Capillary refill takes less than 2 seconds. Comments: There is an area of erythema on the right lateral lower leg. There is a central area of fluctuance where the I&D was performed. A small amount of purulent material could be expressed from this area. No crepitus. The patient does not have pain out of proportion to exam.  DP and PT pulses are palpable and symmetrical bilaterally. Neurological:      Mental Status: He is alert and oriented to person, place, and time. Mental status is at baseline. Psychiatric:         Mood and Affect: Mood normal.       DIFFERENTIAL DIAGNOSIS/ MDM:     75-year-old male here with right lower extremity cellulitis with abscess status post incision and drainage, and failure of outpatient management with oral antibiotics. I have low suspicion for necrotizing fasciitis as the patient does not have pain out of proportion to exam.  There is no crepitus. The erythema is relatively well demarcated. An x-ray was negative for tissue gas. The patient also has new onset diabetes with a glucose over 600. He appears dehydrated. Patient was found to have an oxygen saturation that was on the low side, sometimes as low as 90%. Breath sounds are clear. I did evaluate the patient for pulmonary embolism which was negative. He is negative for COVID and flu. Plan for admission for IV fluid resuscitation, glucose control, IV antibiotics.     DIAGNOSTIC RESULTS     EKG: All EKG's are interpreted by the Emergency Department Physician who either signs or Co-signs this chart in the absence of a cardiologist.    EKG Interpretation    Interpreted by emergency department physician    Rhythm: sinus tachycardia  Rate: tachycardia  Axis: normal  Ectopy: none  Conduction: normal  ST Segments: normal  T Waves: non specific changes  Q Waves: III    Clinical Impression: sinus tachycardia    Bowen Wyatt MD      RADIOLOGY:    XR TIBIA FIBULA RIGHT (2 VIEWS)    Result Date: 12/26/2022  EXAMINATION: 3 XRAY VIEWS OF THE RIGHT TIBIA AND FIBULA 12/26/2022 10:20 pm COMPARISON: None. HISTORY: ORDERING SYSTEM PROVIDED HISTORY: leg pain TECHNOLOGIST PROVIDED HISTORY: leg pain Reason for Exam: Lower right leg infection FINDINGS: No tibial or fibular fracture is seen. No osseous erosive process. Achilles insertional enthesophytes. No acute osseous abnormality. No soft tissue gas or foreign body. CT CHEST PULMONARY EMBOLISM W CONTRAST    Result Date: 12/27/2022  EXAMINATION: CTA OF THE CHEST 12/26/2022 11:46 pm TECHNIQUE: CTA of the chest was performed after the administration of intravenous contrast.  Multiplanar reformatted images are provided for review. MIP images are provided for review. Automated exposure control, iterative reconstruction, and/or weight based adjustment of the mA/kV was utilized to reduce the radiation dose to as low as reasonably achievable. COMPARISON: None. HISTORY: ORDERING SYSTEM PROVIDED HISTORY: Shortness of breath, r/o PE TECHNOLOGIST PROVIDED HISTORY: r/o PE Decision Support Exception - unselect if not a suspected or confirmed emergency medical condition->Emergency Medical Condition (MA) Reason for Exam: r/o PE FINDINGS: Pulmonary Arteries: Pulmonary arteries are adequately opacified for evaluation. No evidence of intraluminal filling defect to suggest pulmonary embolism. Main pulmonary artery is normal in caliber. Mediastinum: No evidence of mediastinal lymphadenopathy. The heart and pericardium demonstrate no acute abnormality.   There is no acute abnormality of the thoracic aorta. Lungs/pleura: The lungs are without acute process. No focal consolidation or pulmonary edema. Minimal dependent atelectasis in the right lower lobe. No evidence of pleural effusion or pneumothorax. Upper Abdomen: Diffuse hepatic steatosis. Mucosal prominence of the stomach is seen, suspicious for acute gastritis. Soft Tissues/Bones: No acute bone or soft tissue abnormality. No evidence of pulmonary embolism or acute pulmonary abnormality. Mucosal prominence of the stomach, suspicious for acute gastritis. Diffuse hepatic steatosis. RECOMMENDATIONS: Unavailable        Interpretation per the Radiologist below, if available at the time of this note:        LABS:  Results for orders placed or performed during the hospital encounter of 12/26/22   COVID-19, Rapid    Specimen: Nasopharyngeal Swab   Result Value Ref Range    Specimen Description . NASOPHARYNGEAL SWAB     SARS-CoV-2, Rapid Not Detected Not Detected   Rapid Influenza A/B Antigens    Specimen: Nasopharyngeal   Result Value Ref Range    Flu A Antigen NEGATIVE NEGATIVE    Flu B Antigen NEGATIVE NEGATIVE   Basic Metabolic Panel   Result Value Ref Range    Glucose 609 (HH) 70 - 99 mg/dL    BUN 46 (H) 6 - 20 mg/dL    Creatinine 0.64 (L) 0.70 - 1.20 mg/dL    Est, Glom Filt Rate >60 >60 mL/min/1.73m2    Calcium 10.2 8.6 - 10.4 mg/dL    Sodium 137 135 - 144 mmol/L    Potassium 4.2 3.7 - 5.3 mmol/L    Chloride 99 98 - 107 mmol/L    CO2 21 20 - 31 mmol/L    Anion Gap 17 9 - 17 mmol/L   CBC with Auto Differential   Result Value Ref Range    WBC 7.7 3.5 - 11.0 k/uL    RBC 5.80 4.5 - 5.9 m/uL    Hemoglobin 13.6 13.5 - 17.5 g/dL    Hematocrit 42.8 41 - 53 %    MCV 73.7 (L) 80 - 100 fL    MCH 23.4 (L) 26 - 34 pg    MCHC 31.8 31 - 37 g/dL    RDW 16.0 (H) 12.5 - 15.4 %    Platelets 789 (H) 284 - 450 k/uL    MPV 7.5 6.0 - 12.0 fL    Seg Neutrophils 81 (H) 36 - 66 %    Lymphocytes 10 (L) 24 - 44 %    Monocytes 9 2 - 11 %    Eosinophils % 0 (L) 1 - 4 %    Basophils 0 0 - 2 %    Segs Absolute 6.20 1.8 - 7.7 k/uL    Absolute Lymph # 0.80 (L) 1.0 - 4.8 k/uL    Absolute Mono # 0.70 0.1 - 1.2 k/uL    Absolute Eos # 0.00 0.0 - 0.4 k/uL    Basophils Absolute 0.00 0.0 - 0.2 k/uL   Beta-Hydroxybutyrate   Result Value Ref Range    Beta-Hydroxybutyrate 1.70 (H) 0.02 - 0.27 mmol/L   Urinalysis   Result Value Ref Range    Color, UA Yellow Yellow    Turbidity UA Clear Clear    Glucose, Ur 3+ (A) NEGATIVE    Bilirubin Urine NEGATIVE NEGATIVE    Ketones, Urine SMALL (A) NEGATIVE    Specific Gravity, UA 1.015 1.005 - 1.030    Urine Hgb SMALL (A) NEGATIVE    pH, UA 5.5 5.0 - 8.0    Protein, UA 1+ (A) NEGATIVE    Urobilinogen, Urine Normal Normal    Nitrite, Urine NEGATIVE NEGATIVE    Leukocyte Esterase, Urine NEGATIVE NEGATIVE   Microscopic Urinalysis   Result Value Ref Range    WBC, UA 2 TO 5 0 - 5 /HPF    RBC, UA 2 TO 5 0 - 2 /HPF    Epithelial Cells UA 0 TO 2 0 - 5 /HPF    Bacteria, UA None None    Other Observations UA (A) NOT REQ. Utilizing a urinalysis as the only screening method to exclude a potential uropathogen can be unreliable in many patient populations. Rapid screening tests are less sensitive than culture and if UTI is a clinical possibility, culture should be considered despite a negative urinalysis.      D-Dimer, Quantitative   Result Value Ref Range    D-Dimer, Quant 0.57 mg/L FEU   POC Glucose Fingerstick   Result Value Ref Range    POC Glucose 545 (HH) 75 - 110 mg/dL   Venous Blood Gas, POC   Result Value Ref Range    pH, Roque 7.424 7.320 - 7.430    pCO2, Roque 38.9 (L) 41.0 - 51.0 mm Hg    pO2, Roque 63.4 (H) 30.0 - 50.0 mm Hg    HCO3, Venous 25.4 22.0 - 29.0 mmol/L    Positive Base Excess, Roque 1 0.0 - 3.0    O2 Sat, Roque 92 (H) 60.0 - 85.0 %    O2 Device/Flow/% Room Air     Shmuel Test NOT APPLICABLE    POC Glucose Fingerstick   Result Value Ref Range    POC Glucose 453 (HH) 75 - 110 mg/dL       Elevated glucose, elevated D-dimer, normal pH, normal serum bicarbonate, elevated BUN and creatinine, UA negative for UTI, COVID-negative    EMERGENCY DEPARTMENT COURSE:   Vitals:    Vitals:    12/27/22 0115 12/27/22 0116 12/27/22 0117 12/27/22 0118   BP: (!) 143/88      Pulse: (!) 115 (!) 112 (!) 113 (!) 117   Resp: 13 15 17 14   Temp:       TempSrc:       SpO2:       Weight:         -------------------------  BP: (!) 143/88, Temp: 99.3 °F (37.4 °C), Heart Rate: (!) 117, Resp: 14    The patient was given the following medications:  Orders Placed This Encounter   Medications    0.9 % sodium chloride bolus    vancomycin (VANCOCIN) intermittent dosing (placeholder)     Order Specific Question:   Antimicrobial Indications     Answer:   Skin and Soft Tissue Infection     Order Specific Question:   Skin duration of therapy     Answer:   5 days    vancomycin (VANCOCIN) 1,250 mg in dextrose 5 % 250 mL IVPB     Order Specific Question:   Antimicrobial Indications     Answer:   Skin and Soft Tissue Infection    vancomycin (VANCOCIN) 1,250 mg in dextrose 5 % 250 mL IVPB     Order Specific Question:   Antimicrobial Indications     Answer:   Skin and Soft Tissue Infection     Order Specific Question:   Skin duration of therapy     Answer:   5 days    insulin regular (HUMULIN R;NOVOLIN R) injection 10 Units    0.9 % sodium chloride bolus    iopamidol (ISOVUE-370) 76 % injection 75 mL    sodium chloride flush 0.9 % injection 10 mL    insulin regular (HUMULIN R;NOVOLIN R) injection 10 Units    0.9 % sodium chloride bolus        RE-EVALUATION:  I updated the patient on test results and plan of care. He is agreeable to admission. CONSULTS:  Internal Medicine      PROCEDURES:  None    FINAL IMPRESSION      1. Cellulitis of right lower extremity    2. Hyperglycemia    3. Diabetes mellitus, new onset ([facility] [address]          DISPOSITION/PLAN   DISPOSITION Admitted 12/27/2022 01:21:08 AM      CONDITION ON DISPOSITION:   Stable     PATIENT REFERRED TO:  No follow-up provider specified.     DISCHARGE MEDICATIONS:  New Prescriptions    No medications on file       (Please note that portions of this note were completed with a voicerecognition program.  Efforts were made to edit the dictations but occasionally words are mis-transcribed.)    Corazon Britt MD  Attending Emergency Medicine Physician        Bowen Wyatt MD  12/27/22 3018

## 2022-12-27 NOTE — PLAN OF CARE
Problem: Discharge Planning  Goal: Discharge to home or other facility with appropriate resources  Outcome: Progressing     Problem: Skin/Tissue Integrity  Goal: Absence of new skin breakdown  Description: 1. Monitor for areas of redness and/or skin breakdown  2. Assess vascular access sites hourly  3. Every 4-6 hours minimum:  Change oxygen saturation probe site  4. Every 4-6 hours:  If on nasal continuous positive airway pressure, respiratory therapy assess nares and determine need for appliance change or resting period.   Outcome: Progressing     Problem: Nutrition Deficit:  Goal: Optimize nutritional status  Outcome: Progressing

## 2022-12-27 NOTE — PROGRESS NOTES
[address] [facility] Pharmacokinetic Monitoring Service - Vancomycin     Sunni Barber is a 38 y.o. male starting on vancomycin therapy for slin/soft tissue infection. Pharmacy consulted by Dr Yin Mcclelland for monitoring and adjustment. Target Concentration: Goal AUC/BRYAN 400-600 mg*hr/L    Additional Antimicrobials: N/A    Pertinent Laboratory Values: Wt Readings from Last 1 Encounters:   12/26/22 188 lb (85.3 kg)     Temp Readings from Last 1 Encounters:   12/26/22 99.3 °F (37.4 °C) (Oral)     CrCl cannot be calculated (Unknown ideal weight. ). Recent Labs     12/26/22 2110   CREATININE 0.64*   WBC 7.7     Procalcitonin: N/A    Pertinent Cultures:  Culture Date Source Results   N/A N/A N/A   MRSA Nasal Swab: N/A. Non-respiratory infection.     Plan:  Dosing recommendations based on Bayesian software  Start vancomycin 1250 mg IVPB q12h  Anticipated AUC of 450 and trough concentration of 13 at steady state  Renal labs as indicated   Vancomycin concentration not ordered yet  Pharmacy will continue to monitor patient and adjust therapy as indicated    Thank you for the consult,  Michael Masterson, [facility]  12/26/2022 9:58 PM

## 2022-12-27 NOTE — CARE COORDINATION
Case Management Assessment  Initial Evaluation    Date/Time of Evaluation: 12/27/2022 9:16 AM  Assessment Completed by: Caitlin Beyer RN    If patient is discharged prior to next notation, then this note serves as note for discharge by case management. Patient Name: Zoltan Grijalva                   YOB: 1980  Diagnosis: Hyperglycemia [R73.9]  Diabetes mellitus, new onset ([facility] [address] [E11.9]  Cellulitis of right lower extremity [B03.240]  Cellulitis and abscess of right leg [L03.115, L02.415]                   Date / Time: 12/26/2022  8:54 PM    Patient Admission Status: Inpatient   Readmission Risk (Low < 19, Mod (19-27), High > 27): Readmission Risk Score: 9.1    Current PCP: None None  PCP verified by CM? Yes    Chart Reviewed: Yes      History Provided by: Patient  Patient Orientation: Alert and Oriented    Patient Cognition: Alert    Hospitalization in the last 30 days (Readmission):  No    If yes, Readmission Assessment in CM Navigator will be completed. Advance Directives:      Code Status: Full Code   Patient's Primary Decision Maker is: Legal Next of Kin      Discharge Planning:    Patient lives with: Spouse/Significant Other Type of Home: House  Primary Care Giver: Self  Patient Support Systems include: Spouse/Significant Other   Current Financial resources:    Current community resources:    Current services prior to admission: None            Current DME:              Type of Home Care services:  None    ADLS  Prior functional level: Independent in ADLs/IADLs  Current functional level: Independent in ADLs/IADLs    PT AM-PAC:   /24  OT AM-PAC:   /24    Family can provide assistance at DC: Would you like Case Management to discuss the discharge plan with any other family members/significant others, and if so, who?     Plans to Return to Present Housing: Yes  Other Identified Issues/Barriers to RETURNING to current housing:   Potential Assistance needed at discharge: N/A            Potential DME:    Patient expects to discharge to: House  Plan for transportation at discharge: Family    Financial    Payor: /     Does insurance require precert for SNF: No    Potential assistance Purchasing Medications:    Meds-to-Beds request:        [facility] [address], [address] [place] [address] [phone] Isabel Card [phone]  [address] [phone]  Phone: [phone] Fax: [phone]      Notes:    Factors facilitating achievement of predicted outcomes:     Barriers to discharge: Additional Case Management Notes: d/c home independent    The Plan for Transition of Care is related to the following treatment goals of Hyperglycemia [R73.9]  Diabetes mellitus, new onset ([address] [E11.9]  Cellulitis of right lower extremity [L03.115]  Cellulitis and abscess of right leg [L03.115, O50.390]    IF APPLICABLE: The Patient and/or patient representative Kimberly Singh and his family were provided with a choice of provider and agrees with the discharge plan. Freedom of choice list with basic dialogue that supports the patient's individualized plan of care/goals and shares the quality data associated with the providers was provided to: Patient   Patient Representative Name:       The Patient and/or Patient Representative Agree with the Discharge Plan?  Yes    Ashia Collier RN  Case Management Department  Ph: [phone] Fax: [phone]

## 2022-12-28 VITALS
SYSTOLIC BLOOD PRESSURE: 123 MMHG | OXYGEN SATURATION: 94 % | DIASTOLIC BLOOD PRESSURE: 89 MMHG | WEIGHT: 186.95 LBS | BODY MASS INDEX: 22.77 KG/M2 | HEIGHT: 76 IN | TEMPERATURE: 98.2 F | RESPIRATION RATE: 15 BRPM | HEART RATE: 83 BPM

## 2022-12-28 LAB
ABSOLUTE EOS #: 0.2 K/UL (ref 0–0.4)
ABSOLUTE LYMPH #: 1 K/UL (ref 1–4.8)
ABSOLUTE MONO #: 0.5 K/UL (ref 0.1–1.2)
ALBUMIN SERPL-MCNC: 2.7 G/DL (ref 3.5–5.2)
ALBUMIN/GLOBULIN RATIO: 0.9 (ref 1–2.5)
ALP BLD-CCNC: 98 U/L (ref 40–129)
ALT SERPL-CCNC: 61 U/L (ref 5–41)
ANION GAP SERPL CALCULATED.3IONS-SCNC: 6 MMOL/L (ref 9–17)
AST SERPL-CCNC: 55 U/L
BASOPHILS # BLD: 1 % (ref 0–2)
BASOPHILS ABSOLUTE: 0 K/UL (ref 0–0.2)
BILIRUB SERPL-MCNC: 0.3 MG/DL (ref 0.3–1.2)
BUN BLDV-MCNC: 15 MG/DL (ref 6–20)
CALCIUM SERPL-MCNC: 8.3 MG/DL (ref 8.6–10.4)
CHLORIDE BLD-SCNC: 106 MMOL/L (ref 98–107)
CO2: 25 MMOL/L (ref 20–31)
CREAT SERPL-MCNC: 0.52 MG/DL (ref 0.7–1.2)
CULTURE: ABNORMAL
DIRECT EXAM: ABNORMAL
EOSINOPHILS RELATIVE PERCENT: 4 % (ref 1–4)
GFR SERPL CREATININE-BSD FRML MDRD: >60 ML/MIN/1.73M2
GLUCOSE BLD-MCNC: 178 MG/DL (ref 75–110)
GLUCOSE BLD-MCNC: 184 MG/DL (ref 70–99)
GLUCOSE BLD-MCNC: 193 MG/DL (ref 75–110)
GLUCOSE BLD-MCNC: 195 MG/DL (ref 75–110)
GLUCOSE BLD-MCNC: 211 MG/DL (ref 75–110)
HCT VFR BLD CALC: 35.6 % (ref 41–53)
HEMOGLOBIN: 11.4 G/DL (ref 13.5–17.5)
LYMPHOCYTES # BLD: 20 % (ref 24–44)
Lab: ABNORMAL
MCH RBC QN AUTO: 23.7 PG (ref 26–34)
MCHC RBC AUTO-ENTMCNC: 32.1 G/DL (ref 31–37)
MCV RBC AUTO: 73.8 FL (ref 80–100)
MONOCYTES # BLD: 10 % (ref 2–11)
PDW BLD-RTO: 16.1 % (ref 12.5–15.4)
PLATELET # BLD: 397 K/UL (ref 140–450)
PMV BLD AUTO: 6.9 FL (ref 6–12)
POTASSIUM SERPL-SCNC: 4.3 MMOL/L (ref 3.7–5.3)
RBC # BLD: 4.82 M/UL (ref 4.5–5.9)
SEG NEUTROPHILS: 65 % (ref 36–66)
SEGMENTED NEUTROPHILS ABSOLUTE COUNT: 3.4 K/UL (ref 1.8–7.7)
SODIUM BLD-SCNC: 137 MMOL/L (ref 135–144)
SPECIMEN DESCRIPTION: ABNORMAL
TOTAL PROTEIN: 5.7 G/DL (ref 6.4–8.3)
VANCOMYCIN RANDOM: 8.5 UG/ML
WBC # BLD: 5.2 K/UL (ref 3.5–11)

## 2022-12-28 PROCEDURE — 6360000002 HC RX W HCPCS: Performed by: STUDENT IN AN ORGANIZED HEALTH CARE EDUCATION/TRAINING PROGRAM

## 2022-12-28 PROCEDURE — 85025 COMPLETE CBC W/AUTO DIFF WBC: CPT

## 2022-12-28 PROCEDURE — 2580000003 HC RX 258: Performed by: STUDENT IN AN ORGANIZED HEALTH CARE EDUCATION/TRAINING PROGRAM

## 2022-12-28 PROCEDURE — 99238 HOSP IP/OBS DSCHRG MGMT 30/<: CPT | Performed by: STUDENT IN AN ORGANIZED HEALTH CARE EDUCATION/TRAINING PROGRAM

## 2022-12-28 PROCEDURE — 80053 COMPREHEN METABOLIC PANEL: CPT

## 2022-12-28 PROCEDURE — 80202 ASSAY OF VANCOMYCIN: CPT

## 2022-12-28 PROCEDURE — 6370000000 HC RX 637 (ALT 250 FOR IP): Performed by: STUDENT IN AN ORGANIZED HEALTH CARE EDUCATION/TRAINING PROGRAM

## 2022-12-28 PROCEDURE — 36415 COLL VENOUS BLD VENIPUNCTURE: CPT

## 2022-12-28 PROCEDURE — 82947 ASSAY GLUCOSE BLOOD QUANT: CPT

## 2022-12-28 RX ORDER — CLINDAMYCIN HYDROCHLORIDE 300 MG/1
300 CAPSULE ORAL 4 TIMES DAILY
Qty: 40 CAPSULE | Refills: 0 | Status: SHIPPED | OUTPATIENT
Start: 2022-12-28 | End: 2023-01-07

## 2022-12-28 RX ORDER — INSULIN GLARGINE 100 [IU]/ML
20 INJECTION, SOLUTION SUBCUTANEOUS 2 TIMES DAILY
Qty: 5 ADJUSTABLE DOSE PRE-FILLED PEN SYRINGE | Refills: 3 | Status: SHIPPED | OUTPATIENT
Start: 2022-12-28

## 2022-12-28 RX ORDER — BLOOD-GLUCOSE METER
1 KIT MISCELLANEOUS DAILY
Qty: 1 KIT | Refills: 0 | Status: SHIPPED | OUTPATIENT
Start: 2022-12-28

## 2022-12-28 RX ORDER — INSULIN LISPRO 100 [IU]/ML
0-16 INJECTION, SOLUTION INTRAVENOUS; SUBCUTANEOUS
Qty: 2 ADJUSTABLE DOSE PRE-FILLED PEN SYRINGE | Refills: 3 | Status: SHIPPED | OUTPATIENT
Start: 2022-12-28

## 2022-12-28 RX ORDER — PEN NEEDLE, DIABETIC 30 GX5/16"
1 NEEDLE, DISPOSABLE MISCELLANEOUS DAILY
Qty: 100 EACH | Refills: 3 | Status: SHIPPED | OUTPATIENT
Start: 2022-12-28

## 2022-12-28 RX ADMIN — ENOXAPARIN SODIUM 40 MG: 100 INJECTION SUBCUTANEOUS at 08:34

## 2022-12-28 RX ADMIN — INSULIN GLARGINE 20 UNITS: 100 INJECTION, SOLUTION SUBCUTANEOUS at 08:34

## 2022-12-28 RX ADMIN — VANCOMYCIN HYDROCHLORIDE 1500 MG: 1.5 INJECTION, POWDER, LYOPHILIZED, FOR SOLUTION INTRAVENOUS at 10:06

## 2022-12-28 ASSESSMENT — PAIN DESCRIPTION - LOCATION: LOCATION: ANKLE

## 2022-12-28 ASSESSMENT — PAIN DESCRIPTION - ORIENTATION: ORIENTATION: RIGHT

## 2022-12-28 ASSESSMENT — PAIN SCALES - GENERAL: PAINLEVEL_OUTOF10: 1

## 2022-12-28 NOTE — PROGRESS NOTES
[address] [facility] Pharmacokinetic Monitoring Service - Vancomycin    Consulting Provider: Jose Alberto Washburn   Indication: SSTI  Target Concentration: Goal AUC/BRYAN 400-600 mg*hr/L  Day of Therapy: 3  Additional Antimicrobials: n/a    Pertinent Laboratory Values: Wt Readings from Last 1 Encounters:   12/28/22 186 lb 15.2 oz (84.8 kg)     Temp Readings from Last 1 Encounters:   12/28/22 98.2 °F (36.8 °C) (Oral)     Estimated Creatinine Clearance: 217 mL/min (A) (based on SCr of 0.52 mg/dL (L)). Recent Labs     12/27/22  0614 12/28/22  0524   CREATININE 0.55* 0.52*   WBC 6.4 5.2       Pertinent Cultures:  Culture Date Source Results   12/27 wound Gram (-) rods, gram (+) rods and cocci in pairs   MRSA Nasal Swab: N/A. Non-respiratory infection.     Recent vancomycin administrations                     vancomycin (VANCOCIN) 1,250 mg in dextrose 5 % 250 mL IVPB (mg) 1,250 mg New Bag 12/27/22 2344     1,250 mg New Bag  1100    vancomycin (VANCOCIN) 1,250 mg in dextrose 5 % 250 mL IVPB (mg) 1,250 mg New Bag 12/26/22 2203                    Assessment:  Date/Time Current Dose Concentration Timing of Concentration (h) AUC   12/28 1250mg q12 hours 8.5 5.75 325   Note: Serum concentrations collected for AUC dosing may appear elevated if collected in close proximity to the dose administered, this is not necessarily an indication of toxicity    Plan:  Current dosing regimen is sub-therapeutic  Increase dose to 1500mg IV q12 hours  Repeat vancomycin concentration will be based upon clinical response and any renal changes  Pharmacy will continue to monitor patient and adjust therapy as indicated    Thank you for the consult,  GENOVEVA Weiner TIFFANI [facility]  12/28/2022 8:05 AM

## 2022-12-28 NOTE — DISCHARGE SUMMARY
[facility]  Office: [address], DO, Jenny Columbia, DO, Rica Beets, DO, Suraj Castro Blood, DO, Kenya Chawla MD, Dudley Schultz MD, Johnnie Claude, MD, Emily Breaux MD,  Demetria Worthy MD, Truett Burkitt, MD, Dianne Taylor, DO, Laura Clifford MD,  Michelle Wing MD, Jered Mathews MD, Vane Gracia, DO, Cyndie Gutierrez MD, Crissy Polk MD, Lissette Parikh, DO, Yanique Headley MD, Bessy Rios MD, Dyana Aguilar MD, Deb Viveros MD, Sherice Hurley, DO, Constantine Moser MD, Rosalva Prescott MD, Lane Medeiros, CNP,  Lori Bazzi, CNP, Shruthi Mcwilliams, CNP, Hermelinda Lancaster, CNP,  Yulia Azevedo, [facility], Marylen Net, CNP, Yung Suarez, CNP, Maribel Cummings, CNP, Fransisca Bradley, CNP, Tim Quijano, CNP, MALIA MinaC, Gianna Chapman, CNS, Ghassan Ghosh, CNP, Kelle Prado, CNP         [address] [facility]    Discharge Summary     Patient ID: Zoltan Grijalva  :  1980   MRN: [id number]     ACCOUNT:  [de-identified]   Patient's PCP: Tequila Dugan  Admit Date: 2022   Discharge Date: 2022     Length of Stay: 1  Code Status:  Full Code  Admitting Physician: Jered Mathews MD  Discharge Physician: Jered Mathews MD     Active Discharge Diagnoses:     Hospital Problem Lists:  Principal Problem:    Cellulitis and abscess of right leg  Active Problems:    Type 2 diabetes mellitus with skin complication, without long-term current use of insulin ([facility]  Resolved Problems:    * No resolved hospital problems. *      Admission Condition:  fair     Discharged Condition: good    Hospital Stay:     Hospital Course:  Zoltan Grijalva is a 38 y.o. male with a past medical history of recently diagnosed Type II DM who presented to the emergency department on 2022 complaining of pain and swelling in his right leg. The patient states that his symptoms began several days ago and have progressively worsened.  The patient was diagnosed with a right lower extremity abscess as well as new-onset Type II DM by his PCP several days ago and states that the abscess was lanced in his office. The patient was started on Bactrim following the procedure but states that he has not noticed any improvement with this. In the ED, the patient was afebrile but tachycardic and ill-appearing. His blood sugar was noted to be 609 on presentation but the patient was not in DKA. He was admitted to internal medicine for further management of right lower extremity abscess/cellulitis as well as hyperglycemia secondary to new-onset Type II DM. The patient's cellulitis improved with IV antibiotics and his blood sugars were well-controlled with twice-daily Lantus and sliding scale insulin. The patient is discharged home today (12/28) in stable condition. He is instructed to complete a course of clindamycin as prescribed and follow-up with his primary care physician as scheduled. Significant therapeutic interventions: IV antibiotics; insulin titration     Significant Diagnostic Studies:   Labs / Micro:  BMP:    Lab Results   Component Value Date/Time    GLUCOSE 184 12/28/2022 05:24 AM     12/28/2022 05:24 AM    K 4.3 12/28/2022 05:24 AM     12/28/2022 05:24 AM    CO2 25 12/28/2022 05:24 AM    ANIONGAP 6 12/28/2022 05:24 AM    BUN 15 12/28/2022 05:24 AM    CREATININE 0.52 12/28/2022 05:24 AM    CALCIUM 8.3 12/28/2022 05:24 AM    LABGLOM >60 12/28/2022 05:24 AM     Radiology:  XR TIBIA FIBULA RIGHT (2 VIEWS)    Result Date: 12/26/2022  No acute osseous abnormality. No soft tissue gas or foreign body. CT CHEST PULMONARY EMBOLISM W CONTRAST    Result Date: 12/27/2022  No evidence of pulmonary embolism or acute pulmonary abnormality. Mucosal prominence of the stomach, suspicious for acute gastritis. Diffuse hepatic steatosis.  RECOMMENDATIONS: Unavailable       Consultations:    Consults:     Final Specialist Recommendations/Findings:   PHARMACY TO DOSE VANCOMYCIN  IP CONSULT TO DIETITIAN      The patient was seen and examined on day of discharge and this discharge summary is in conjunction with any daily progress note from day of discharge. Discharge plan:     Disposition: Home    Physician Follow Up:     Kezia Dean  [address] [facility]  [phone]    Schedule an appointment as soon as possible for a visit in 3 day(s)  Hospital follow-up       Requiring Further Evaluation/Follow Up POST HOSPITALIZATION/Incidental Findings: Patient will need to follow-up with his primary care physician for further titration of his insulin. Diet: diabetic diet    Activity: As tolerated    Instructions to Patient: Take clindamycin as prescribed. Follow-up with your primary care physician as scheduled for further titration of your diabetes medications.      Discharge Medications:      Medication List        START taking these medications      clindamycin 300 MG capsule  Commonly known as: CLEOCIN  Take 1 capsule by mouth 4 times daily for 10 days     glucose monitoring kit  1 kit by Does not apply route daily     insulin lispro (1 Unit Dial) 100 UNIT/ML Sopn  Commonly known as: HumaLOG KwikPen  Inject 0-16 Units into the skin 3 times daily (before meals) Glucose  no insulin; 200-249 4 units; 250-299 8 units; 300-349 12 units; over 349 16 units     Lantus SoloStar 100 UNIT/ML injection pen  Generic drug: insulin glargine  Inject 20 Units into the skin 2 times daily     Pen Needles 3/16\" 31G X 5 MM Misc  1 each by Does not apply route daily            STOP taking these medications      sulfamethoxazole-trimethoprim 800-160 MG per tablet  Commonly known as: BACTRIM DS;SEPTRA DS               Where to Get Your Medications        These medications were sent to [facility], [facility] [address] [address] [facility]      Phone: [phone]   clindamycin 300 MG capsule  glucose monitoring kit  insulin lispro (1 Unit Dial) 100 UNIT/ML Sopn  Lantus SoloStar 100 UNIT/ML injection pen  Pen Needles 3/16\" 31G X 5 MM Misc         Discharge Procedure Orders   [facility]   Referral Priority: Routine Referral Type: Eval and Treat   Referral Reason: Specialty Services Required   Number of Visits Requested: 1       Time Spent on discharge is  20 mins in patient examination, evaluation, counseling as well as medication reconciliation, prescriptions for required medications, discharge plan and follow up. Electronically signed by   Cristiano Fatima MD  12/28/2022  9:55 AM      Thank you Dr. Devon Rehman for the opportunity to be involved in this patient's care.

## 2022-12-28 NOTE — PROGRESS NOTES
Writer contacted Pharmacy about patient vancomycin medication. Pharmacist Thang Stevens verified that there is not any 1250 mg vancomycin available at this facility. Per Pharmacist Thang Stevens, to reconstitute 2 bottles of 1gm of vancomycin with 20ml of sterile water. Once it is reconstituted to draw 20ml from on vial and 5ml from the other to make the dose of 1250 mg of vancomycin. Writer read back order to Pharmacist and let NP know about the situation. During the medication preparation another RN witness and verified the dosage.

## 2022-12-28 NOTE — PROGRESS NOTES
Education provided on need for insulin long acting vs short, proper way to administer insulin, washing hands before and after, pt able to provide safe and proper demonstration of insulin administration subq, s/s to look with high vs low blood sugar, pt denies further questions at time, and tolerated well.

## 2022-12-28 NOTE — PROGRESS NOTES
Sunni Barber was evaluated today and a DME order was entered for a glucometer and diabetic testing supplies because he requires this for treatment of diabetes mellitus. The patient has been sufficiently trained to use the device and he has skills to be able to use this device as intended. This patient is  requiring the use of insulin for diabetic control. The patient will require testing 3 times per day to achieve optimal diabetic control. The need for this equipment was discussed with the patient and he understands and is in agreement.

## 2022-12-28 NOTE — PLAN OF CARE
Problem: Discharge Planning  Goal: Discharge to home or other facility with appropriate resources  12/28/2022 0059 by Lashell Valadez RN  Outcome: Progressing  12/27/2022 1327 by Kali Duong RN  Outcome: Progressing  Flowsheets (Taken 12/27/2022 0800)  Discharge to home or other facility with appropriate resources: Identify barriers to discharge with patient and caregiver     Problem: Skin/Tissue Integrity  Goal: Absence of new skin breakdown  Description: 1. Monitor for areas of redness and/or skin breakdown  2. Assess vascular access sites hourly  3. Every 4-6 hours minimum:  Change oxygen saturation probe site  4. Every 4-6 hours:  If on nasal continuous positive airway pressure, respiratory therapy assess nares and determine need for appliance change or resting period.   12/28/2022 0059 by Lashell Valadez RN  Outcome: Progressing  12/27/2022 1327 by Kali Duong RN  Outcome: Progressing     Problem: Pain  Goal: Verbalizes/displays adequate comfort level or baseline comfort level  12/28/2022 0059 by Lashell Valadez RN  Outcome: Progressing  12/27/2022 1327 by Kali Duong RN  Outcome: Progressing     Problem: Nutrition Deficit:  Goal: Optimize nutritional status  12/28/2022 0059 by Lashell Valadez RN  Outcome: Progressing  12/27/2022 1327 by Kali Duong RN  Outcome: Progressing